# Patient Record
Sex: MALE | Race: BLACK OR AFRICAN AMERICAN | Employment: OTHER | URBAN - METROPOLITAN AREA
[De-identification: names, ages, dates, MRNs, and addresses within clinical notes are randomized per-mention and may not be internally consistent; named-entity substitution may affect disease eponyms.]

---

## 2018-05-27 ENCOUNTER — RECORDS - HEALTHEAST (OUTPATIENT)
Dept: LAB | Facility: CLINIC | Age: 63
End: 2018-05-27

## 2018-05-28 LAB
ANION GAP SERPL CALCULATED.3IONS-SCNC: 11 MMOL/L (ref 5–18)
BUN SERPL-MCNC: 21 MG/DL (ref 8–22)
CALCIUM SERPL-MCNC: 10 MG/DL (ref 8.5–10.5)
CHLORIDE BLD-SCNC: 100 MMOL/L (ref 98–107)
CO2 SERPL-SCNC: 26 MMOL/L (ref 22–31)
CREAT SERPL-MCNC: 0.79 MG/DL (ref 0.7–1.3)
ERYTHROCYTE [DISTWIDTH] IN BLOOD BY AUTOMATED COUNT: 15.3 % (ref 11–14.5)
GFR SERPL CREATININE-BSD FRML MDRD: >60 ML/MIN/1.73M2
GLUCOSE BLD-MCNC: 120 MG/DL (ref 70–125)
HCT VFR BLD AUTO: 27.5 % (ref 40–54)
HGB BLD-MCNC: 8.6 G/DL (ref 14–18)
MCH RBC QN AUTO: 23.2 PG (ref 27–34)
MCHC RBC AUTO-ENTMCNC: 31.3 G/DL (ref 32–36)
MCV RBC AUTO: 74 FL (ref 80–100)
PLATELET # BLD AUTO: 500 THOU/UL (ref 140–440)
PMV BLD AUTO: 9.7 FL (ref 8.5–12.5)
POTASSIUM BLD-SCNC: 4 MMOL/L (ref 3.5–5)
RBC # BLD AUTO: 3.7 MILL/UL (ref 4.4–6.2)
SODIUM SERPL-SCNC: 137 MMOL/L (ref 136–145)
WBC: 13.8 THOU/UL (ref 4–11)

## 2018-08-27 ENCOUNTER — HOSPITAL ENCOUNTER (OUTPATIENT)
Dept: PHYSICAL THERAPY | Facility: CLINIC | Age: 63
Setting detail: THERAPIES SERIES
End: 2018-08-27
Attending: SPECIALIST
Payer: COMMERCIAL

## 2018-08-27 PROCEDURE — 97110 THERAPEUTIC EXERCISES: CPT | Mod: GP | Performed by: PHYSICAL THERAPIST

## 2018-08-27 PROCEDURE — 97161 PT EVAL LOW COMPLEX 20 MIN: CPT | Mod: GP | Performed by: PHYSICAL THERAPIST

## 2018-08-27 PROCEDURE — 40000189 ZZH STATISTIC PT POOL VISIT: Performed by: PHYSICAL THERAPIST

## 2018-08-27 NOTE — PROGRESS NOTES
08/27/18 1113   Quick Adds   Type of Visit Initial OP PT Evaluation   General Information   Start of Care Date 08/27/18   Referring Physician Jae Richards MD  ([facility])   Orders Evaluate and Treat as Indicated   Order Date 08/16/18   Medical Diagnosis Arthrodesis Status Z98.1, s/p PSF L4-5, L5-S1   Onset of illness/injury or Date of Surgery 05/19/18   Precautions/Limitations other (see comments)  (pt reports able to wean out of brace and progress activity)   Special Instructions therapeutic pool   Surgical/Medical history reviewed Yes   Pertinent history of current problem (include personal factors and/or comorbidities that impact the POC) Pt presents to PT for initiation of pool therapy to address chronic LBP.  He has undergone several lumbarn diskectomy procedures since 2002, but more recently underwent anterior and posterior L4-S1 fusion on 5/19/18.  He reports he has progressed from using an LSO at all times, to only using it with longer distance walking at this time.  He reports the LLE radicular symptoms he was having prior to surgery have improved, but now noting intermittent R thigh tingling and pain at times.  He reports pain fluctuates from 6-9/10 about his low back, hips, and R>L thighs when he walks, pain relatively constant in nature, improved with pain meds (oxycodone).  Pain does impair his ability to sleep throughout the night.  Has tried pool therapy years ago with mixed results.  Otherwise, functioning independently at baseline, enjoys walking, currently retired due to disability (back pain).     Prior level of function comment Indep PLOF, enjoys walking.  Has progress from using a FWW post op, discharged to [facility], now returned home and using no AD, walking functional distances but painful.   Previous/Current Treatment Medication(s)   Improvement after medication Moderate   Current Community Support (spouse)   Patient role/Employment history Retired;Disabled   Living environment  "House/[facility]   Home/Community Accessibility Comments Flight of stairs inside home, has railing, pt reports increased pain but can manage stairs independently.   Assistive Devices Comments no current use of AD, was using FWW immediately post op.   Patient/Family Goals Statement Improve pain and standing/walking activity tolerance, \"To feel better\"   Fall Risk Screen   Fall screen completed by PT   Have you fallen 2 or more times in the past year? No   Have you fallen and had an injury in the past year? No   Timed Up and Go score (seconds) 10.95   Is patient a fall risk? No   Fall screen comments Increased LBP and L thigh pain with mobility/TUG   Functional Scales   Functional Scales and Outcomes Oswestry score 50%, see flowsheet   Pain   Patient currently in pain Yes   Pain location Low back, R gluteals and lateral thigh   Pain rating 6/10   Pain description Discomfort;Sharp;Dull;Deep;Burning   Pain description comment Pain increases with standing activity, improves to 5-6/10 with pain meds, worsens to 9/10 with activity   Cognitive Status Examination   Orientation orientation to person, place and time   Integumentary   Integumentary Comments well healed anterior and posterior spinal fusion surgical sites, mild adhering down of scar tissue noted.  No swelling, redness, or skin breakdown noted   Posture   Posture Comments guarded standing posture, leaning slightly to the R due to L sided LBP   Palpation   Palpation TTP about thoracic>lumbar paraspinals R>L, TTP R gluteals   Range of Motion (ROM)   ROM Comment Limited standing trunk rotation, flexion and extension due to pain/tightness post op.  Tightness and some pain with LTR assessment. WFL BLEs ROM, WNL BUE AROM.   Strength   Strength Comments WFL in BLEs, mild R hip weakness secondary to pain, good activation of TA but overpowering with rectus.  5/5 BLE ankle DF, knee ext, knee flexion, 4+/5 R hip flexion, 5/5 L hip flexion, 4-/5 R hip ABD, 4/5 L hip ABD.   Bed " Mobility   Bed Mobility Comments Indep but painful, pt performing modified log roll technique   Transfer Skills   Transfer Comments Indep, mild use of UEs, stabilizes independently upon standing.   Gait   Gait Comments Amb with slowed speed, guarded posturing with limited arm swing and trunk rotation, decreased stance time R leg due to pain. Reciprocal gait pattern, no AD used.   Gait Special Tests   Gait Special Tests 25 FOOT TIMED WALK   Gait Special Tests 25 Foot Timed Walk   Seconds 7.97   Comments ~0.9-1.0 m/sec gait speed. Norm for age would be 1.2-1.4 m/sec   Balance   Balance Comments Good functional standing balance and gait stability, slowed speed due to pain.  Rhomberg EO and EC x 20 sec with minimal/mild sway.  Appears low fall risk.   Balance Special Tests   Balance Special Tests Romberg;Sit to stand reps   Balance Special Tests Romberg   Seconds 30 Seconds   Comments EO and EC conditions   Balance Special Tests Sit to Stand Reps in 30 Seconds   Reps in 30 seconds 10   Comments no UE support, increased pain reported   Sensory Examination   Sensory Perception Comments mild n/t about R lateral thigh, otherwise intact distally   Coordination   Coordination no deficits were identified   Muscle Tone   Muscle Tone Comments no tone noted, pt indicating lumbar spasms at times   Modality Interventions   Planned Modality Interventions Comments as indicated per therapist discretion   Planned Therapy Interventions   Planned Therapy Interventions gait training;neuromuscular re-education;ROM;strengthening;stretching;transfer training;manual therapy;other (see comments)  (aquatic therapy)   Clinical Impression   Criteria for Skilled Therapeutic Interventions Met yes, treatment indicated   PT Diagnosis Low back pain with impaired functional mobility skills   Influenced by the following impairments LBP, R hip/thigh pain, decreased spinal ROM, proximal hip/core weakness   Functional limitations due to impairments  Impaired tolerance with bed mobility skills, transfers, functional gait distances, stairs   Clinical Presentation Stable/Uncomplicated   Clinical Presentation Rationale stable medical/pain status, indep PLOF, pain levels, PMH   Clinical Decision Making (Complexity) Low complexity   Therapy Frequency 2 times/Week   Predicted Duration of Therapy Intervention (days/wks) 4-6 wks   Risk & Benefits of therapy have been explained Yes   Patient, Family & other staff in agreement with plan of care Yes   Education Assessment   Preferred Learning Style Listening;Reading;Demonstration;Pictures/video   Barriers to Learning No barriers   GOALS   PT Eval Goals 1;2;3   Goal 1   Goal Identifier Pain   Goal Description Jae will report pain no greater than 5/10 with daily activity and sleep for improved daily function and sleep habits (baseline pain ranging from 6-9/10).   Target Date 10/12/18   Goal 2   Goal Identifier Gait Speed   Goal Description Jae will demo 25' Gait in <6.35'' to show improved gait speed to 1.2 m/sec and improved ability to navigate busier community environments (baseline 7.97'').   Target Date 10/12/18   Goal 3   Goal Identifier Oswestry   Goal Description Jae will score <42 on the Oswestry to indicate clinically signficant improvement in pain and less impact on daily activities (baseline score 50%, MCID 12.8%).   Target Date 10/12/18   Goal 4   Goal Identifier HEP   Goal Description Jae will demo (I) with HEP for continued management/improvement of pain and improved function/QOL.   Target Date 10/12/18   Total Evaluation Time   Total Evaluation Time (Minutes) 35

## 2018-09-10 ENCOUNTER — HOSPITAL ENCOUNTER (OUTPATIENT)
Dept: PHYSICAL THERAPY | Facility: CLINIC | Age: 63
Setting detail: THERAPIES SERIES
End: 2018-09-10
Attending: SPECIALIST
Payer: COMMERCIAL

## 2018-09-10 PROCEDURE — 97113 AQUATIC THERAPY/EXERCISES: CPT | Mod: GP | Performed by: PHYSICAL THERAPIST

## 2018-09-10 PROCEDURE — 40000189 ZZH STATISTIC PT POOL VISIT: Performed by: PHYSICAL THERAPIST

## 2018-09-10 NOTE — PROGRESS NOTES
AQUATIC PHYSICAL THERAPY EXERCISE LOG (TRUNK)  Date  Kely Knott, PT  9/10/18   Warm Up Ambulation (Forward/Side/Back/March/All) X 2 laps each             Stretching/ROM Chin Tucks     CROM (Flex/Ext/SB/Rot/All)     Shoulder (Shrugs/Rolls)     Scapular (Retraction/Depression)     Upper Trunk (Levator/Scalene/Upper Trapezius)     Pec Stretch (Unilateral/Bilateral)     Posterior Shoulder     Gluteal(Back against wall)  30 sec x 2 B    Hamstring (Back against wall LE extended)  30 sec x 2 B    Calf (Runner's stretch at Wall) 30 sec x 2 B    Quad     Hip Flexor (Kneel)     Piriformis (Seated/Stand)     Trunk ROM (Flex/Ext/SB/Rot/All)     BAD RAGAZ              Strengthening Abdominal Sets/ Pelvic Tilt With exercises     Shoulder (Flex/Ext, Abd/Add, IR/ER, Circles, Alternation flex/ext for core) With closed paddles away from wall with feet together for balance/core challenge x 10     Horizon (Abd/Add, Diagonals) As above    Rowing Arms     UE PNF (D1/D2)     Abdominal Sets (Push/Pull, side to side, push down with board) with small board initially, increased to med board as tolerating well, x 10    Squat X 10 with wall support B    Lunge Squat     Hip (Flex/Ext, Abd/Add, single leg bike, circles, figure 8, All) X 10 with 1 UE support    Heel/Toe Raises     Step Ups (Forward, Lateral)     Noodle Push Downs with UE(B UE/1 UE) for core strengthening With yellow noodle initially then with white/colored noodle as tolerating well x 10     Noodle Push Downs with LE With yellow noodle initially then with white/colored noodle x 10 as tolerating well    Stir the Pot/Punches with Dumbells     Sit to Stand at Bench     Prone Plank on step     Side Plank on step              Aerobic Fast Walking     Bike/Ski/Kin/All Biking in corner x 5 mins end of session for relaxation             Balance Narrow Base of Support     Tandem Stand     Single Leg Stance     Heel/Toe Walking     3 Step and Stop     Braiding                   Cool Down  Ambulation     [place] Dangle     Whirlpool independently prior to session

## 2018-09-24 ENCOUNTER — HOSPITAL ENCOUNTER (OUTPATIENT)
Dept: PHYSICAL THERAPY | Facility: CLINIC | Age: 63
Setting detail: THERAPIES SERIES
End: 2018-09-24
Attending: SPECIALIST
Payer: COMMERCIAL

## 2018-09-24 PROCEDURE — 40000189 ZZH STATISTIC PT POOL VISIT: Performed by: PHYSICAL THERAPIST

## 2018-09-24 PROCEDURE — 97113 AQUATIC THERAPY/EXERCISES: CPT | Mod: GP | Performed by: PHYSICAL THERAPIST

## 2018-09-24 NOTE — PROGRESS NOTES
AQUATIC PHYSICAL THERAPY EXERCISE LOG (TRUNK)  Date  Kely Knott, PT  9/10/18 Kely Knott, PT  9/24/18   Warm Up Ambulation (Forward/Side/Back/March/All) X 2 laps each X 2 Laps each               Stretching/ROM Chin Tucks      CROM (Flex/Ext/SB/Rot/All)      Shoulder (Shrugs/Rolls)      Scapular (Retraction/Depression)      Upper Trunk (Levator/Scalene/Upper Trapezius)      Pec Stretch (Unilateral/Bilateral)      Posterior Shoulder      Gluteal(Back against wall)  30 sec x 2 B 30 sec x 2 B    Hamstring (Back against wall LE extended)  30 sec x 2 B 30 sec x 2 B    Calf (Runner's stretch at Wall) 30 sec x 2 B 30 SEC X 2    Quad      Hip Flexor (Kneel)      Piriformis (Seated/Stand)      Trunk ROM (Flex/Ext/SB/Rot/All)      BAD RAGAZ        Hip adductor stretch x 30 sec in standing          Strengthening Abdominal Sets/ Pelvic Tilt With exercises  With exericses    Shoulder (Flex/Ext, Abd/Add, IR/ER, Circles, Alternation flex/ext for core) With closed paddles away from wall with feet together for balance/core challenge x 10  With hydrotones for progression, feet together as tolerated for core/balance challenge    Horizon (Abd/Add, Diagonals) As above As above x 10    Rowing Arms      UE PNF (D1/D2)      Abdominal Sets (Push/Pull, side to side, push down with board) with small board initially, increased to med board as tolerating well, x 10 X 10 with med board in wall sit    Squat X 10 with wall support B With wall support x 10    Lunge Squat  X 5 with wall support, discontinued due to cramp in adductor-stretched as stated above with relief, manual cues for performance    Hip (Flex/Ext, Abd/Add, single leg bike, circles, figure 8, All) X 10 with 1 UE support X 10 with wall support- increasing speed as tolerated, needing cues for decreased sway    Heel/Toe Raises      Step Ups (Forward, Lateral)  Lateral lunge on step with wall support x 15    Noodle Push Downs with UE(B UE/1 UE) for core strengthening With yellow noodle  initially then with white/colored noodle as tolerating well x 10  With white/colored noodle x 15 in wall sit, also with 1 UE     Noodle Push Downs with LE With yellow noodle initially then with white/colored noodle x 10 as tolerating well With white/colored noodle x 15    Stir the Pot/Punches with Dumbells      Sit to Stand at Bench      Prone Plank on step      Side Plank on step                 Aerobic Fast Walking      Bike/Ski/Kin/All Biking in corner x 5 mins end of session for relaxation                Balance Narrow Base of Support      Tandem Stand      Single Leg Stance      Heel/Toe Walking      3 Step and Stop      Braiding                       Cool Down Ambulation      [place] Dangle      Whirlpool independently prior to session Inde prior to session and following

## 2018-09-27 ENCOUNTER — HOSPITAL ENCOUNTER (OUTPATIENT)
Dept: PHYSICAL THERAPY | Facility: CLINIC | Age: 63
Setting detail: THERAPIES SERIES
End: 2018-09-27
Attending: SPECIALIST
Payer: COMMERCIAL

## 2018-09-27 PROCEDURE — 40000189 ZZH STATISTIC PT POOL VISIT: Performed by: PHYSICAL THERAPIST

## 2018-09-27 PROCEDURE — 97113 AQUATIC THERAPY/EXERCISES: CPT | Mod: GP | Performed by: PHYSICAL THERAPIST

## 2018-09-27 NOTE — PROGRESS NOTES
AQUATIC PHYSICAL THERAPY EXERCISE LOG (TRUNK)  Date  Kely Knott, PT  9/10/18 Kely Knott, PT  9/24/18 Kely Knott,, PT  9/27/18   Warm Up Ambulation (Forward/Side/Back/March/All) X 2 laps each X 2 Laps each X 2 Laps each                 Stretching/ROM Chin Tucks       CROM (Flex/Ext/SB/Rot/All)       Shoulder (Shrugs/Rolls)       Scapular (Retraction/Depression)       Upper Trunk (Levator/Scalene/Upper Trapezius)       Pec Stretch (Unilateral/Bilateral)       Posterior Shoulder       Gluteal(Back against wall)  30 sec x 2 B 30 sec x 2 B 30 sec x 2 B    Hamstring (Back against wall LE extended)  30 sec x 2 B 30 sec x 2 B 30 sec x 2 B    Calf (Runner's stretch at Wall) 30 sec x 2 B 30 SEC X 2 30 sec x 2 B    Quad       Hip Flexor (Kneel)       Piriformis (Seated/Stand)       Trunk ROM (Flex/Ext/SB/Rot/All)       BAD RAGAZ         Hip adductor stretch x 30 sec in standing  Hip adductor stretch 30 sec x 2          Strengthening Abdominal Sets/ Pelvic Tilt With exercises  With exericses With exericses    Shoulder (Flex/Ext, Abd/Add, IR/ER, Circles, Alternation flex/ext for core) With closed paddles away from wall with feet together for balance/core challenge x 10  With hydrotones for progression, feet together as tolerated for core/balance challenge With hydrotones in kneeling position for progression x 10     Horizon (Abd/Add, Diagonals) As above As above x 10 As above x 10    Rowing Arms       UE PNF (D1/D2)       Abdominal Sets (Push/Pull, side to side, push down with board) with small board initially, increased to med board as tolerating well, x 10 X 10 with med board in wall sit X 10 with large board, not challenging enough, switched to large curved board, cues for shoulder positioning throughout    Squat X 10 with wall support B With wall support x 10 X 10 with wall support    Lunge Squat  X 5 with wall support, discontinued due to cramp in adductor-stretched as stated above with relief, manual cues for  performance     Hip (Flex/Ext, Abd/Add, single leg bike, circles, figure 8, All) X 10 with 1 UE support X 10 with wall support- increasing speed as tolerated, needing cues for decreased sway X 10 with DBs for increased challenge for balance and core    Heel/Toe Raises       Step Ups (Forward, Lateral)  Lateral lunge on step with wall support x 15 X 10 lateral lunge with wall support cues for posture throughout    Noodle Push Downs with UE(B UE/1 UE) for core strengthening With yellow noodle initially then with white/colored noodle as tolerating well x 10  With white/colored noodle x 15 in wall sit, also with 1 UE  X 10 with white noodle for progression in wall sit    Noodle Push Downs with LE With yellow noodle initially then with white/colored noodle x 10 as tolerating well With white/colored noodle x 15 X 10 with white noodle for progression    Stir the Pot/Punches with Dumbells       Sit to Stand at Bench       Prone Plank on step       Side Plank on step                    Aerobic Fast Walking       Bike/Ski/Kin/All Biking in corner x 5 mins end of session for relaxation  Biking with flippers on for progression of LE strength and aerobics x 5 mins                 Balance Narrow Base of Support       Tandem Stand       Single Leg Stance       Heel/Toe Walking       3 Step and Stop       Braiding                           Cool Down Ambulation       [place] Dangle       Whirlpool independently prior to session Inde prior to session and following

## 2018-10-01 ENCOUNTER — HOSPITAL ENCOUNTER (OUTPATIENT)
Dept: PHYSICAL THERAPY | Facility: CLINIC | Age: 63
Setting detail: THERAPIES SERIES
End: 2018-10-01
Attending: SPECIALIST
Payer: COMMERCIAL

## 2018-10-01 PROCEDURE — 40000189 ZZH STATISTIC PT POOL VISIT: Performed by: PHYSICAL THERAPIST

## 2018-10-01 PROCEDURE — 97113 AQUATIC THERAPY/EXERCISES: CPT | Mod: GP | Performed by: PHYSICAL THERAPIST

## 2018-10-01 NOTE — PROGRESS NOTES
AQUATIC PHYSICAL THERAPY EXERCISE LOG (TRUNK)  Date  Kely Knott, PT  9/10/18 Kely Knott, PT  9/24/18 Kely Knott,, PT  9/27/18 Kely Knott, PT   Warm Up Ambulation (Forward/Side/Back/March/All) X 2 laps each X 2 Laps each X 2 Laps each X 2 laps each                   Stretching/ROM Chin Tucks        CROM (Flex/Ext/SB/Rot/All)        Shoulder (Shrugs/Rolls)        Scapular (Retraction/Depression)        Upper Trunk (Levator/Scalene/Upper Trapezius)        Pec Stretch (Unilateral/Bilateral)        Posterior Shoulder        Gluteal(Back against wall)  30 sec x 2 B 30 sec x 2 B 30 sec x 2 B 30 sec x 2 B    Hamstring (Back against wall LE extended)  30 sec x 2 B 30 sec x 2 B 30 sec x 2 B 30 sec x 2 B with noodle    Calf (Runner's stretch at Wall) 30 sec x 2 B 30 SEC X 2 30 sec x 2 B 30 sec x 2 B    Quad        Hip Flexor (Kneel)        Piriformis (Seated/Stand)        Trunk ROM (Flex/Ext/SB/Rot/All)        BAD RAGAZ          Hip adductor stretch x 30 sec in standing  Hip adductor stretch 30 sec x 2            Strengthening Abdominal Sets/ Pelvic Tilt With exercises  With exericses With exericses With exercises    Shoulder (Flex/Ext, Abd/Add, IR/ER, Circles, Alternation flex/ext for core) With closed paddles away from wall with feet together for balance/core challenge x 10  With hydrotones for progression, feet together as tolerated for core/balance challenge With hydrotones in kneeling position for progression x 10  With hydrotones in kneeling, cues for proper positioning x 10    Horizon (Abd/Add, Diagonals) As above As above x 10 As above x 10 As above x 10    Rowing Arms        UE PNF (D1/D2)        Abdominal Sets (Push/Pull, side to side, push down with board) with small board initially, increased to med board as tolerating well, x 10 X 10 with med board in wall sit X 10 with large board, not challenging enough, switched to large curved board, cues for shoulder positioning throughout X 10 with large curved board  with feet together cues for posture and positioning    Squat X 10 with wall support B With wall support x 10 X 10 with wall support     Lunge Squat  X 5 with wall support, discontinued due to cramp in adductor-stretched as stated above with relief, manual cues for performance      Hip (Flex/Ext, Abd/Add, single leg bike, circles, figure 8, All) X 10 with 1 UE support X 10 with wall support- increasing speed as tolerated, needing cues for decreased sway X 10 with DBs for increased challenge for balance and core X 10 with DBs for increased balance challenge, difficult with positioning    Heel/Toe Raises        Step Ups (Forward, Lateral)  Lateral lunge on step with wall support x 15 X 10 lateral lunge with wall support cues for posture throughout     Noodle Push Downs with UE(B UE/1 UE) for core strengthening With yellow noodle initially then with white/colored noodle as tolerating well x 10  With white/colored noodle x 15 in wall sit, also with 1 UE  X 10 with white noodle for progression in wall sit X 10 with white noodle    Noodle Push Downs with LE With yellow noodle initially then with white/colored noodle x 10 as tolerating well With white/colored noodle x 15 X 10 with white noodle for progression X 10 with white noodle    Stir the Pot/Punches with Dumbells        Sit to Stand at Bench        Prone Plank on step    education with verbal, visual and manual cues for proper postioining, once able to maintain position, x 10 LE lifts B    Side Plank on step    X 5 sec hold x 3 with cues for proper positioning- manual and visual. Pt cramping in calf initially but then tolerating well.                    Aerobic Fast Walking        Bike/Ski/Kin/All Biking in corner x 5 mins end of session for relaxation  Biking with flippers on for progression of LE strength and aerobics x 5 mins Biking in corner x 5 mins                     Balance Narrow Base of Support        Tandem Stand        Single Leg Stance        Heel/Toe  Walking        3 Step and Stop        Braiding                               Cool Down Ambulation        [place] Dangle        Whirlpool independently prior to session Inde prior to session and following  Inde following session

## 2018-10-04 ENCOUNTER — HOSPITAL ENCOUNTER (OUTPATIENT)
Dept: PHYSICAL THERAPY | Facility: CLINIC | Age: 63
Setting detail: THERAPIES SERIES
End: 2018-10-04
Attending: SPECIALIST
Payer: COMMERCIAL

## 2018-10-04 PROCEDURE — 97113 AQUATIC THERAPY/EXERCISES: CPT | Mod: GP | Performed by: PHYSICAL THERAPIST

## 2018-10-04 PROCEDURE — 40000189 ZZH STATISTIC PT POOL VISIT: Performed by: PHYSICAL THERAPIST

## 2018-10-04 NOTE — PROGRESS NOTES
AQUATIC PHYSICAL THERAPY EXERCISE LOG (TRUNK)  Date  Kely Knott, PT  9/10/18 Kely Knott, PT  9/24/18 Kely Knott,, PT  9/27/18 Kely Knott, PT Kely Knott, PT   Warm Up Ambulation (Forward/Side/Back/March/All) X 2 laps each X 2 Laps each X 2 Laps each X 2 laps each X 2 Laps each                     Stretching/ROM Chin Tucks         CROM (Flex/Ext/SB/Rot/All)         Shoulder (Shrugs/Rolls)         Scapular (Retraction/Depression)         Upper Trunk (Levator/Scalene/Upper Trapezius)         Pec Stretch (Unilateral/Bilateral)         Posterior Shoulder         Gluteal(Back against wall)  30 sec x 2 B 30 sec x 2 B 30 sec x 2 B 30 sec x 2 B 30 sec x 2 B    Hamstring (Back against wall LE extended)  30 sec x 2 B 30 sec x 2 B 30 sec x 2 B 30 sec x 2 B with noodle 30 sec x 2 B with noodle    Calf (Runner's stretch at Wall) 30 sec x 2 B 30 SEC X 2 30 sec x 2 B 30 sec x 2 B     Quad     30 sec x 2 B with noodle    Hip Flexor (Kneel)         Piriformis (Seated/Stand)         Trunk ROM (Flex/Ext/SB/Rot/All)         BAD RAGAZ           Hip adductor stretch x 30 sec in standing  Hip adductor stretch 30 sec x 2              Strengthening Abdominal Sets/ Pelvic Tilt With exercises  With exericses With exericses With exercises With exercises    Shoulder (Flex/Ext, Abd/Add, IR/ER, Circles, Alternation flex/ext for core) With closed paddles away from wall with feet together for balance/core challenge x 10  With hydrotones for progression, feet together as tolerated for core/balance challenge With hydrotones in kneeling position for progression x 10  With hydrotones in kneeling, cues for proper positioning x 10 With hydrotones in standing with feet together    Horizon (Abd/Add, Diagonals) As above As above x 10 As above x 10 As above x 10 As above x 10     Rowing Arms         UE PNF (D1/D2)         Abdominal Sets (Push/Pull, side to side, push down with board) with small board initially, increased to med board as tolerating well, x  10 X 10 with med board in wall sit X 10 with large board, not challenging enough, switched to large curved board, cues for shoulder positioning throughout X 10 with large curved board with feet together cues for posture and positioning X 10 with large board, feet together x 10    Squat X 10 with wall support B With wall support x 10 X 10 with wall support      Lunge Squat  X 5 with wall support, discontinued due to cramp in adductor-stretched as stated above with relief, manual cues for performance       Hip (Flex/Ext, Abd/Add, single leg bike, circles, figure 8, All) X 10 with 1 UE support X 10 with wall support- increasing speed as tolerated, needing cues for decreased sway X 10 with DBs for increased challenge for balance and core X 10 with DBs for increased balance challenge, difficult with positioning X 10 with DBs for increased balance challenge    Heel/Toe Raises         Step Ups (Forward, Lateral)  Lateral lunge on step with wall support x 15 X 10 lateral lunge with wall support cues for posture throughout      Noodle Push Downs with UE(B UE/1 UE) for core strengthening With yellow noodle initially then with white/colored noodle as tolerating well x 10  With white/colored noodle x 15 in wall sit, also with 1 UE  X 10 with white noodle for progression in wall sit X 10 with white noodle X 10 with white noodle, x 10 with 1 UE on noodle    Noodle Push Downs with LE With yellow noodle initially then with white/colored noodle x 10 as tolerating well With white/colored noodle x 15 X 10 with white noodle for progression X 10 with white noodle X 10 with white noodle    Stir the Pot/Punches with Dumbells         Sit to Stand at Bench         Prone Plank on step    education with verbal, visual and manual cues for proper postioining, once able to maintain position, x 10 LE lifts B X 10 LE lifts with cues for performance    Side Plank on step    X 5 sec hold x 3 with cues for proper positioning- manual and visual. Pt  cramping in calf initially but then tolerating well.  X 10 sec hold x 3 with manual and verbal cues for positioning                     Aerobic Fast Walking         Bike/Ski/Kin/All Biking in corner x 5 mins end of session for relaxation  Biking with flippers on for progression of LE strength and aerobics x 5 mins Biking in corner x 5 mins   Biking in corner x 5 mins                     Balance Narrow Base of Support         Tandem Stand         Single Leg Stance         Heel/Toe Walking         3 Step and Stop         Braiding                                   Cool Down Ambulation         Deep Water Dangle         Whirlpool independently prior to session Inde prior to session and following  Inde following session

## 2018-10-11 ENCOUNTER — HOSPITAL ENCOUNTER (OUTPATIENT)
Dept: PHYSICAL THERAPY | Facility: CLINIC | Age: 63
Setting detail: THERAPIES SERIES
End: 2018-10-11
Attending: SPECIALIST
Payer: COMMERCIAL

## 2018-10-11 PROCEDURE — 40000189 ZZH STATISTIC PT POOL VISIT: Performed by: PHYSICAL THERAPIST

## 2018-10-11 PROCEDURE — 97113 AQUATIC THERAPY/EXERCISES: CPT | Mod: GP | Performed by: PHYSICAL THERAPIST

## 2018-10-11 NOTE — PROGRESS NOTES
Outpatient Physical Therapy Progress Note and Discharge Note     Patient: Jae Brand  : 1955    Beginning/End Dates of Reporting Period:  18 to 10/11/2018    Referring Provider: Jae House Diagnosis: Low back pain with impaired functional mobility skills     Client Self Report: Pt was reporting more stiffness and more painful. Pt reports no increased knee pain. Pt reports pain is on the L side. Pt reports that his pain is up to 8/10.     Objective Measurements:        Objective Measure: 25' Gait  Details: 5.75        Objective Measure: Oswestry  Details: 66%     Goals:  Goal Identifier Pain   Goal Description Jae will report pain no greater than 5/10 with daily activity and sleep for improved daily function and sleep habits (baseline pain ranging from 6-9/10). (Pt reports that his pain is about 6-8 in the past week. )   Target Date 10/12/18   Date Met   not met   Progress:  Pt continues to have high average pain in the morning 6-8/10, but improves as the day goes on     Goal Identifier Gait Speed   Goal Description Jae will demo 25' Gait in <6.35'' to show improved gait speed to 1.2 m/sec and improved ability to navigate busier community environments (baseline 7.97''). (5.75 seconds)   Target Date 10/12/18   Date Met  10/11/18   Progress:Pt improving gait speed today.     Goal Identifier Oswestry   Goal Description Jae will score <42 on the Oswestry to indicate clinically signficant improvement in pain and less impact on daily activities (baseline score 50%, MCID 12.8%). (66%)   Target Date 10/12/18   Date Met   not met   Progress:Pt perceived disability with subjective scoring continues to range high.      Goal Identifier HEP   Goal Description Jae will demo (I) with HEP for continued management/improvement of pain and improved function/QOL. (goal met)   Target Date 10/12/18   Date Met   Goals met   Progress:Pt is able to meet HEP goal with handouts.     Progress Toward  Goals:   Progress this reporting period: Pt was able to meet 2/4 goals today. Pt feels comfortable with HEP with exercise handouts. Pt gait speed decreased during his POC. Pt continues to rate his disability high and pain high in the morning.     Plan:  Discharge from therapy.    Discharge:    Reason for Discharge: Pt is independent with HEP with handouts. Pt will continue independently at a [facility] closer to home.     Equipment Issued: handouts    Discharge Plan: Patient to continue home program.

## 2018-10-11 NOTE — PROGRESS NOTES
AQUATIC PHYSICAL THERAPY EXERCISE LOG (TRUNK)  Date  Kely Knott, PT  9/10/18 Kely Knott, PT  9/24/18 Kely Knott,, PT  9/27/18 Kely Knott, PT Kely Knott, PT Kely Knott, PT  10/11/18   Warm Up Ambulation (Forward/Side/Back/March/All) X 2 laps each X 2 Laps each X 2 Laps each X 2 laps each X 2 Laps each X 2 laps each                       Stretching/ROM Chin Tucks          CROM (Flex/Ext/SB/Rot/All)          Shoulder (Shrugs/Rolls)          Scapular (Retraction/Depression)          Upper Trunk (Levator/Scalene/Upper Trapezius)          Pec Stretch (Unilateral/Bilateral)          Posterior Shoulder          Gluteal(Back against wall)  30 sec x 2 B 30 sec x 2 B 30 sec x 2 B 30 sec x 2 B 30 sec x 2 B 30 sec x 2 B    Hamstring (Back against wall LE extended)  30 sec x 2 B 30 sec x 2 B 30 sec x 2 B 30 sec x 2 B with noodle 30 sec x 2 B with noodle 30 sec x 2 B- with noodle    Calf (Runner's stretch at Wall) 30 sec x 2 B 30 SEC X 2 30 sec x 2 B 30 sec x 2 B  30 sec x 2     Quad     30 sec x 2 B with noodle 30 sec x 2 with noodle    Hip Flexor (Kneel)          Piriformis (Seated/Stand)          Trunk ROM (Flex/Ext/SB/Rot/All)          BAD RAGAZ            Hip adductor stretch x 30 sec in standing  Hip adductor stretch 30 sec x 2                Strengthening Abdominal Sets/ Pelvic Tilt With exercises  With exericses With exericses With exercises With exercises With exercises    Shoulder (Flex/Ext, Abd/Add, IR/ER, Circles, Alternation flex/ext for core) With closed paddles away from wall with feet together for balance/core challenge x 10  With hydrotones for progression, feet together as tolerated for core/balance challenge With hydrotones in kneeling position for progression x 10  With hydrotones in kneeling, cues for proper positioning x 10 With hydrotones in standing with feet together With hydrotones- standing with feet together- discussed kneeling or wall sit for progression x 10     Horizon (Abd/Add, Diagonals) As  above As above x 10 As above x 10 As above x 10 As above x 10  As above    Rowing Arms          UE PNF (D1/D2)          Abdominal Sets (Push/Pull, side to side, push down with board) with small board initially, increased to med board as tolerating well, x 10 X 10 with med board in wall sit X 10 with large board, not challenging enough, switched to large curved board, cues for shoulder positioning throughout X 10 with large curved board with feet together cues for posture and positioning X 10 with large board, feet together x 10 X 10 with large curved board with wall sit- education on standing with feet together    Squat X 10 with wall support B With wall support x 10 X 10 with wall support   X 10 without wall support    Lunge Squat  X 5 with wall support, discontinued due to cramp in adductor-stretched as stated above with relief, manual cues for performance    On step for better posture x 10    Hip (Flex/Ext, Abd/Add, single leg bike, circles, figure 8, All) X 10 with 1 UE support X 10 with wall support- increasing speed as tolerated, needing cues for decreased sway X 10 with DBs for increased challenge for balance and core X 10 with DBs for increased balance challenge, difficult with positioning X 10 with DBs for increased balance challenge X 10 with DBs for balance challenge- discussed using wall support for more strengthening    Heel/Toe Raises          Step Ups (Forward, Lateral)  Lateral lunge on step with wall support x 15 X 10 lateral lunge with wall support cues for posture throughout       Noodle Push Downs with UE(B UE/1 UE) for core strengthening With yellow noodle initially then with white/colored noodle as tolerating well x 10  With white/colored noodle x 15 in wall sit, also with 1 UE  X 10 with white noodle for progression in wall sit X 10 with white noodle X 10 with white noodle, x 10 with 1 UE on noodle X 10 with white noodle    Noodle Push Downs with LE With yellow noodle initially then with  white/colored noodle x 10 as tolerating well With white/colored noodle x 15 X 10 with white noodle for progression X 10 with white noodle X 10 with white noodle X 10 with white noodle    Stir the Pot/Punches with Dumbells          Sit to Stand at Bench          Prone Plank on step    education with verbal, visual and manual cues for proper postioining, once able to maintain position, x 10 LE lifts B X 10 LE lifts with cues for performance Declined this today    Side Plank on step    X 5 sec hold x 3 with cues for proper positioning- manual and visual. Pt cramping in calf initially but then tolerating well.  X 10 sec hold x 3 with manual and verbal cues for positioning Declined this today                       Aerobic Fast Walking          Bike/Ski/Kin/All Biking in corner x 5 mins end of session for relaxation  Biking with flippers on for progression of LE strength and aerobics x 5 mins Biking in corner x 5 mins   Biking in corner x 5 mins Biking x 5 mins-discussed use of flippers for progression                       Balance Narrow Base of Support          Tandem Stand          Single Leg Stance          Heel/Toe Walking          3 Step and Stop          Braiding                                       Cool Down Ambulation          Deep Water Dangle          Whirlpool independently prior to session Inde prior to session and following  Inde following session